# Patient Record
Sex: FEMALE | Race: WHITE | NOT HISPANIC OR LATINO | Employment: FULL TIME | URBAN - METROPOLITAN AREA
[De-identification: names, ages, dates, MRNs, and addresses within clinical notes are randomized per-mention and may not be internally consistent; named-entity substitution may affect disease eponyms.]

---

## 2022-04-19 ENCOUNTER — APPOINTMENT (OUTPATIENT)
Dept: MRI IMAGING | Facility: CLINIC | Age: 52
End: 2022-04-19
Attending: EMERGENCY MEDICINE
Payer: COMMERCIAL

## 2022-04-19 ENCOUNTER — HOSPITAL ENCOUNTER (EMERGENCY)
Facility: CLINIC | Age: 52
Discharge: HOME | End: 2022-04-20
Attending: EMERGENCY MEDICINE | Admitting: EMERGENCY MEDICINE
Payer: COMMERCIAL

## 2022-04-19 DIAGNOSIS — R90.89 ABNORMAL BRAIN MRI: ICD-10-CM

## 2022-04-19 DIAGNOSIS — R51.9 ACUTE NONINTRACTABLE HEADACHE, UNSPECIFIED HEADACHE TYPE: ICD-10-CM

## 2022-04-19 DIAGNOSIS — R20.2 PARESTHESIAS: ICD-10-CM

## 2022-04-19 LAB
ANION GAP SERPL CALCULATED.3IONS-SCNC: 6 MMOL/L (ref 3–14)
APTT PPP: 26 SECONDS (ref 22–38)
BASOPHILS # BLD AUTO: 0 10E3/UL (ref 0–0.2)
BASOPHILS NFR BLD AUTO: 0 %
BUN SERPL-MCNC: 9 MG/DL (ref 7–30)
CALCIUM SERPL-MCNC: 9.1 MG/DL (ref 8.5–10.1)
CHLORIDE BLD-SCNC: 102 MMOL/L (ref 94–109)
CO2 SERPL-SCNC: 27 MMOL/L (ref 20–32)
CREAT SERPL-MCNC: 0.68 MG/DL (ref 0.52–1.04)
EOSINOPHIL # BLD AUTO: 0.1 10E3/UL (ref 0–0.7)
EOSINOPHIL NFR BLD AUTO: 1 %
ERYTHROCYTE [DISTWIDTH] IN BLOOD BY AUTOMATED COUNT: 12.9 % (ref 10–15)
GFR SERPL CREATININE-BSD FRML MDRD: >90 ML/MIN/1.73M2
GLUCOSE BLD-MCNC: 84 MG/DL (ref 70–99)
HCT VFR BLD AUTO: 43.6 % (ref 35–47)
HGB BLD-MCNC: 13.9 G/DL (ref 11.7–15.7)
HOLD SPECIMEN: NORMAL
IMM GRANULOCYTES # BLD: 0 10E3/UL
IMM GRANULOCYTES NFR BLD: 0 %
INR PPP: 0.96 (ref 0.85–1.15)
LYMPHOCYTES # BLD AUTO: 1.9 10E3/UL (ref 0.8–5.3)
LYMPHOCYTES NFR BLD AUTO: 24 %
MCH RBC QN AUTO: 29.7 PG (ref 26.5–33)
MCHC RBC AUTO-ENTMCNC: 31.9 G/DL (ref 31.5–36.5)
MCV RBC AUTO: 93 FL (ref 78–100)
MONOCYTES # BLD AUTO: 0.5 10E3/UL (ref 0–1.3)
MONOCYTES NFR BLD AUTO: 7 %
NEUTROPHILS # BLD AUTO: 5.4 10E3/UL (ref 1.6–8.3)
NEUTROPHILS NFR BLD AUTO: 68 %
NRBC # BLD AUTO: 0 10E3/UL
NRBC BLD AUTO-RTO: 0 /100
PLATELET # BLD AUTO: 266 10E3/UL (ref 150–450)
POTASSIUM BLD-SCNC: 3.2 MMOL/L (ref 3.4–5.3)
RBC # BLD AUTO: 4.68 10E6/UL (ref 3.8–5.2)
SODIUM SERPL-SCNC: 135 MMOL/L (ref 133–144)
WBC # BLD AUTO: 7.9 10E3/UL (ref 4–11)

## 2022-04-19 PROCEDURE — 70544 MR ANGIOGRAPHY HEAD W/O DYE: CPT

## 2022-04-19 PROCEDURE — A9585 GADOBUTROL INJECTION: HCPCS | Performed by: EMERGENCY MEDICINE

## 2022-04-19 PROCEDURE — 99285 EMERGENCY DEPT VISIT HI MDM: CPT | Mod: 25

## 2022-04-19 PROCEDURE — 255N000002 HC RX 255 OP 636: Performed by: EMERGENCY MEDICINE

## 2022-04-19 PROCEDURE — 36415 COLL VENOUS BLD VENIPUNCTURE: CPT | Performed by: EMERGENCY MEDICINE

## 2022-04-19 PROCEDURE — 70553 MRI BRAIN STEM W/O & W/DYE: CPT

## 2022-04-19 PROCEDURE — 70549 MR ANGIOGRAPH NECK W/O&W/DYE: CPT

## 2022-04-19 PROCEDURE — 85730 THROMBOPLASTIN TIME PARTIAL: CPT | Performed by: EMERGENCY MEDICINE

## 2022-04-19 PROCEDURE — 85610 PROTHROMBIN TIME: CPT | Performed by: EMERGENCY MEDICINE

## 2022-04-19 PROCEDURE — 85025 COMPLETE CBC W/AUTO DIFF WBC: CPT | Performed by: EMERGENCY MEDICINE

## 2022-04-19 PROCEDURE — 80048 BASIC METABOLIC PNL TOTAL CA: CPT | Performed by: EMERGENCY MEDICINE

## 2022-04-19 RX ORDER — SODIUM CHLORIDE 9 MG/ML
INJECTION, SOLUTION INTRAVENOUS CONTINUOUS PRN
Status: DISCONTINUED | OUTPATIENT
Start: 2022-04-19 | End: 2022-04-20 | Stop reason: HOSPADM

## 2022-04-19 RX ORDER — GADOBUTROL 604.72 MG/ML
10 INJECTION INTRAVENOUS ONCE
Status: COMPLETED | OUTPATIENT
Start: 2022-04-19 | End: 2022-04-19

## 2022-04-19 RX ADMIN — GADOBUTROL 10 ML: 604.72 INJECTION INTRAVENOUS at 22:40

## 2022-04-19 ASSESSMENT — ENCOUNTER SYMPTOMS
SPEECH DIFFICULTY: 0
HEADACHES: 1
NUMBNESS: 1

## 2022-04-19 ASSESSMENT — VISUAL ACUITY: OU: NORMAL ACUITY;BASELINE;GLASSES

## 2022-04-20 ENCOUNTER — APPOINTMENT (OUTPATIENT)
Dept: CT IMAGING | Facility: CLINIC | Age: 52
End: 2022-04-20
Attending: EMERGENCY MEDICINE
Payer: COMMERCIAL

## 2022-04-20 VITALS
DIASTOLIC BLOOD PRESSURE: 72 MMHG | SYSTOLIC BLOOD PRESSURE: 125 MMHG | TEMPERATURE: 98.3 F | OXYGEN SATURATION: 100 % | HEART RATE: 65 BPM | RESPIRATION RATE: 18 BRPM | WEIGHT: 130 LBS

## 2022-04-20 PROCEDURE — 70450 CT HEAD/BRAIN W/O DYE: CPT | Mod: 76

## 2022-04-20 PROCEDURE — 70450 CT HEAD/BRAIN W/O DYE: CPT

## 2022-04-20 PROCEDURE — 250N000013 HC RX MED GY IP 250 OP 250 PS 637: Performed by: EMERGENCY MEDICINE

## 2022-04-20 RX ORDER — POTASSIUM CHLORIDE 1500 MG/1
40 TABLET, EXTENDED RELEASE ORAL ONCE
Status: COMPLETED | OUTPATIENT
Start: 2022-04-20 | End: 2022-04-20

## 2022-04-20 RX ADMIN — POTASSIUM CHLORIDE 40 MEQ: 1500 TABLET, EXTENDED RELEASE ORAL at 00:22

## 2022-04-20 NOTE — ED NOTES
[facility] 6-Hour Head CT shows no evidence of bleed. Will discharge home    Head CT w/o contrast (Preliminary result)  Result time 04/20/22 07:45:53  Preliminary result by Edmond Mccartney MD (04/20/22 07:45:53)                Impression:    IMPRESSION:  Normal head CT.                    Thomas Hogue MD  04/20/22 4884

## 2022-04-20 NOTE — ED PROVIDER NOTES
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 The patient reports that she began experiencing left sided jaw pain on Sunday. She was able to sleep through the night, but woke up with more severe jaw pain and a left sided headache on Monday Today, she subsequently developed an upper and lower extremity tingling sensation on the left side. This sensation has been lingering, but not accelerating in severity. She also felt neuropathic pain at this time, which was similar to pain she experienced after suffering a stroke eight years ago. The patient denies vision changes, word finding difficulty, or increased left sided weakness. Also denies history of migraines or diagnosed TMJ. She no longer has a severe headache here in the ED, though there is some headache that remains.       Review of Systems   HENT:        Positive for jaw pain   Eyes: Negative for visual disturbance.   Neurological: Positive for numbness and headaches. Negative for speech difficulty.   All other systems reviewed and are negative.    Allergies:  Augmentin  Erythromycin  Sulfamethoxazole-Trimethoprim    Medications:  Aspirin   Famotidine prn    Past Medical History:     Central pain syndrome   Neuropathic pain  PFO with atrial septal aneurysm   CVA  GERD    Past Surgical History:    CVL septal closure PFO    Family History:    No known family history     Social History:  Arrives by car  Unaccompanied in the ED     Physical Exam     Patient Vitals for the past 24 hrs:   BP Temp Temp src Pulse Resp SpO2 Weight   04/20/22 0215 138/88 -- -- 56 -- 99 % --   04/20/22 0122 -- -- -- -- -- 100 % --   04/20/22 0121 (!) 144/86 -- -- 58 -- 100 % --   04/20/22 0019 -- -- -- -- -- 100 % --   04/20/22 0018 139/89 -- -- 60 -- -- --   04/19/22 2124 -- -- -- -- -- 98 % --   04/19/22 2123 (!)  140/88 -- -- 53 -- 98 % --   04/19/22 1948 (!) 148/90 98.3  F (36.8  C) Temporal 58 18 100 % 59 kg (130 lb)       Physical Exam  Constitutional: Vital signs reviewed as above.   HENT:    Head: No external signs of trauma noted.   Eyes: Pupils are equal, round, and reactive to light.   Cardiovascular: Normal rate, regular rhythm, normal heart sounds and intact distal pulses.    Pulmonary/Chest: Effort normal and breath sounds normal. No respiratory distress. No wheezes noted.   Gastrointestinal: Soft. There is no tenderness. There is no rebound.   Musculoskeletal:   No deformities appreciated   No edema noted  Neurological:    Patient is alert and oriented to person, place, and time.    Speech is fluent, cognition is normal.   CN 2-12 intact (PERRL, EOMI, symmetric smile, equal eye squeeze and forehead raise, normal and equal sensation to bilateral forehead/cheek/chin, grossly equal hearing B/L, midline tongue protrusion with nl side-to-side movement, normal shoulder shrug).    RUE strength 5/5: , finger abd, wrist flex/ext, elbow flex/ext.    LUE strength 5/5: , finger abd, wrist flex/ext, elbow flex/ext.    RLE strength 5/5: ankle flex/ext, knee flex/ext, hip flex.    LLE strength 5/5: ankle flex/ext, knee flex/ext, hip flex.    Sensation equal in all 4 extremities.    No arm drift.     Cerebellar: Normal rapid alternating movements    ( finger-nose-finger, rapid pronation/supination, hand rolling)    Normal heel-to-shin   Normal gait as observed in the ED.   Skin: Skin is warm and dry.   Psychiatric: The patient appears somewhat anxious        Emergency Department Course     Imaging:  Head CT w/o contrast   Final Result   IMPRESSION:   1.  The dural thickening/subdural hemorrhage along the right greater than left anterior frontal lobes is better appreciated on the prior MRI. No definite acute hemorrhage.   2.  Chronic changes, as described.      MR Brain w/o & w Contrast   Final Result   IMPRESSION:   HEAD  MRI:    1.  Mild chronic infarction right basal ganglia with chronic hemosiderin deposition. Small focus of chronic hemosiderin deposition at the posterior aspect of right basal ganglia.   2.  No acute infarct, mass, or mass effect.   3.  Either small amount of dural thickening or small amount of subdural fluid overlying anterior aspects of both frontal lobes right greater than left. If there is clinical suspicion for hemorrhage, CT may be performed. Best appreciated on axial FLAIR    image 16.      HEAD MRA:    1.  Normal MRA [place].      NECK MRA:   1.  Normal neck MRA.      MRA Neck (Carotids) wo & w Contrast   Final Result   IMPRESSION:   HEAD MRI:    1.  Mild chronic infarction right basal ganglia with chronic hemosiderin deposition. Small focus of chronic hemosiderin deposition at the posterior aspect of right basal ganglia.   2.  No acute infarct, mass, or mass effect.   3.  Either small amount of dural thickening or small amount of subdural fluid overlying anterior aspects of both frontal lobes right greater than left. If there is clinical suspicion for hemorrhage, CT may be performed. Best appreciated on axial FLAIR    image 16.      HEAD MRA:    1.  Normal MRA [place].      NECK MRA:   1.  Normal neck MRA.      MRA Brain ([place]) wo Contrast   Final Result   IMPRESSION:   HEAD MRI:    1.  Mild chronic infarction right basal ganglia with chronic hemosiderin deposition. Small focus of chronic hemosiderin deposition at the posterior aspect of right basal ganglia.   2.  No acute infarct, mass, or mass effect.   3.  Either small amount of dural thickening or small amount of subdural fluid overlying anterior aspects of both frontal lobes right greater than left. If there is clinical suspicion for hemorrhage, CT may be performed. Best appreciated on axial FLAIR    image 16.      HEAD MRA:    1.  Normal MRA [place].      NECK MRA:   1.  Normal neck MRA.      Head CT w/o  contrast    (Results Pending)     Report per radiology    Laboratory:  Labs Ordered and Resulted from Time of ED Arrival to Time of ED Departure   BASIC METABOLIC PANEL - Abnormal       Result Value    Sodium 135      Potassium 3.2 (*)     Chloride 102      Carbon Dioxide (CO2) 27      Anion Gap 6      Urea Nitrogen 9      Creatinine 0.68      Calcium 9.1      Glucose 84      GFR Estimate >90     INR - Normal    INR 0.96     PARTIAL THROMBOPLASTIN TIME - Normal    aPTT 26     GLUCOSE MONITOR NURSING POCT   CBC WITH PLATELETS AND DIFFERENTIAL    WBC Count 7.9      RBC Count 4.68      Hemoglobin 13.9      Hematocrit 43.6      MCV 93      MCH 29.7      MCHC 31.9      RDW 12.9      Platelet Count 266      % Neutrophils 68      % Lymphocytes 24      % Monocytes 7      % Eosinophils 1      % Basophils 0      % Immature Granulocytes 0      NRBCs per 100 WBC 0      Absolute Neutrophils 5.4      Absolute Lymphocytes 1.9      Absolute Monocytes 0.5      Absolute Eosinophils 0.1      Absolute Basophils 0.0      Absolute Immature Granulocytes 0.0      Absolute NRBCs 0.0          Emergency Department Course:    Reviewed:  I reviewed nursing notes, vitals, past medical history and Care Everywhere    ED Course as of 04/20/22 0306   Wed Apr 20, 2022   0016 Reviewed MRI results. Will order CT.   0018 Updated patient.   0158 D/W Chantel Hutchinson NP (Neurosurgery). Would recommend repeat CT at 6 AM. If stable. Can DC.   0305 Pt signed out to Dr. Hogue       Interventions:  Medications   sodium chloride 0.9% infusion (has no administration in time range)   gadobutrol (GADAVIST) injection 10 mL (10 mLs Intravenous Given 4/19/22 2240)   sodium chloride (PF) 0.9% PF flush 60 mL (60 mLs Intravenous Given 4/19/22 2239)   potassium chloride ER (KLOR-CON M) CR tablet 40 mEq (40 mEq Oral Given 4/20/22 0022)     Disposition:  Patient was signed over to my colleague, Dr. Hogue pending repeat CT of the head at 6 AM.    Impression & Plan     CMS  Diagnoses: None    Medical Decision Making:  This 51-year-old female patient presents to the ED due to headache and what seems like left-sided paresthesias.  Please see the HPI and exam for specifics.    The patient remained stable in the ED.  Based on her prior history of stroke and the symptoms that she perceived, I thought imaging was warranted.  There were no acute findings seen on her MRI though it did raise the question of intracranial hemorrhage.  Subsequent CT was not able to really define this better.  I talked with neurosurgery who recommended repeat CT of the head around 6 AM and if it is normal she can be discharged from a neurosurgical perspective.    I think that if that CT is normal she can be discharged from an emergency medicine perspective as well.  Her symptoms could be due to TMJ as she initially noted left-sided jaw pain that seem to spread to her head. At this time, I will sign her case over to my colleague for disposition pending repeat CT.    Diagnosis:    ICD-10-CM    1. Acute nonintractable headache, unspecified headache type  R51.9    2. Paresthesias  R20.2        Discharge Medications:  New Prescriptions    No medications on file       Scribe Disclosure:  I, Jeronimo Childress, am serving as a scribe at 7:53 PM on 4/19/2022 to document services personally performed by Kameron Rodriguez DO based on my observations and the provider's statements to me.            Kameron Rodriguez DO  04/20/22 0214       Kameron Rodriguez DO  04/20/22 0303

## 2022-04-20 NOTE — ED NOTES
Patient ambulated to new room with steady gait.  Provided with blankets and snacks.  She denies any further needs at this time.  Provider ok with not being on monitor and only getting q4 vital signs.

## 2022-04-20 NOTE — ED TRIAGE NOTES
Pt here with c/o L arm tingling since 1500 today. Developed numbness to L jaw that radiates into head and L neck since Sunday. Hx CVA 8 years ago, no deficits sustained, but had L arm numbness as primary symptom. Takes 81 mg ASA daily. No facial droop,slurred speech, arm drift or gait issues. No visual changes. Equal hand . ABC intact.

## 2022-04-20 NOTE — ED NOTES
Pt. reports slightly worsened numbness and tingling as well as headache, pt. reports she feels it has worsened with her anxiety about the imaging results.

## 2022-04-20 NOTE — PROGRESS NOTES
Contacted regarding 51 to female presenting to ER with let arm numbness.    Brain MRI read as possible small SDH versus dural thickening.      IMPRESSION:  HEAD MRI:   1.  Mild chronic infarction right basal ganglia with chronic hemosiderin deposition. Small focus of chronic hemosiderin deposition at the posterior aspect of right basal ganglia.  2.  No acute infarct, mass, or mass effect.  3.  Either small amount of dural thickening or small amount of subdural fluid overlying anterior aspects of both frontal lobes right greater than left. If there is clinical suspicion for hemorrhage, CT may be performed. Best appreciated on axial FLAIR   image 16.RI read as possible     Head CT:  1.  The dural thickening/subdural hemorrhage along the right greater than left anterior frontal lobes is better appreciated on the prior MRI. No definite acute hemorrhage.  2.  Chronic changes, as described.    RECOMMENDATIONS:  Unlikely any SDH but will plan to repeat head CT at 6 AM    JAYCEE Pruitt  [facility]  [address]  [address] [place] [id number]    Tel [phone]  Pager [phone]

## 2023-04-23 ENCOUNTER — HEALTH MAINTENANCE LETTER (OUTPATIENT)
Age: 53
End: 2023-04-23

## 2024-06-29 ENCOUNTER — HEALTH MAINTENANCE LETTER (OUTPATIENT)
Age: 54
End: 2024-06-29

## 2025-05-11 ENCOUNTER — HEALTH MAINTENANCE LETTER (OUTPATIENT)
Age: 55
End: 2025-05-11